# Patient Record
Sex: MALE | Race: WHITE | NOT HISPANIC OR LATINO | URBAN - METROPOLITAN AREA
[De-identification: names, ages, dates, MRNs, and addresses within clinical notes are randomized per-mention and may not be internally consistent; named-entity substitution may affect disease eponyms.]

---

## 2021-10-02 ENCOUNTER — IMMUNIZATION (OUTPATIENT)
Dept: URGENT CARE | Facility: CLINIC | Age: 36
End: 2021-10-02

## 2021-10-02 DIAGNOSIS — Z23 NEED FOR VACCINATION: Primary | ICD-10-CM

## 2021-10-02 PROCEDURE — 0011A COVID-19, MRNA, LNP-S, PF, 100 MCG/0.5 ML DOSE VACCINE: ICD-10-PCS | Mod: S$GLB,,, | Performed by: NURSE PRACTITIONER

## 2021-10-02 PROCEDURE — 0011A COVID-19, MRNA, LNP-S, PF, 100 MCG/0.5 ML DOSE VACCINE: CPT | Mod: S$GLB,,, | Performed by: NURSE PRACTITIONER

## 2021-10-02 PROCEDURE — 91301 COVID-19, MRNA, LNP-S, PF, 100 MCG/0.5 ML DOSE VACCINE: ICD-10-PCS | Mod: S$GLB,,, | Performed by: NURSE PRACTITIONER

## 2021-10-02 PROCEDURE — 91301 COVID-19, MRNA, LNP-S, PF, 100 MCG/0.5 ML DOSE VACCINE: CPT | Mod: S$GLB,,, | Performed by: NURSE PRACTITIONER

## 2022-07-20 ENCOUNTER — HOSPITAL ENCOUNTER (EMERGENCY)
Facility: HOSPITAL | Age: 37
Discharge: HOME | End: 2022-07-20
Attending: EMERGENCY MEDICINE
Payer: COMMERCIAL

## 2022-07-20 VITALS
RESPIRATION RATE: 20 BRPM | DIASTOLIC BLOOD PRESSURE: 95 MMHG | BODY MASS INDEX: 41.16 KG/M2 | HEIGHT: 71 IN | OXYGEN SATURATION: 97 % | SYSTOLIC BLOOD PRESSURE: 166 MMHG | HEART RATE: 86 BPM | WEIGHT: 294 LBS | TEMPERATURE: 99 F

## 2022-07-20 DIAGNOSIS — S93.402A SPRAIN OF LEFT ANKLE, UNSPECIFIED LIGAMENT, INITIAL ENCOUNTER: Primary | ICD-10-CM

## 2022-07-20 DIAGNOSIS — S99.912A LEFT ANKLE INJURY, INITIAL ENCOUNTER: ICD-10-CM

## 2022-07-20 PROCEDURE — 99283 EMERGENCY DEPT VISIT LOW MDM: CPT

## 2022-07-20 PROCEDURE — 25000003 PHARM REV CODE 250: Performed by: PHYSICIAN ASSISTANT

## 2022-07-20 RX ORDER — IBUPROFEN 800 MG/1
800 TABLET ORAL EVERY 8 HOURS PRN
Qty: 30 TABLET | Refills: 0 | Status: SHIPPED | OUTPATIENT
Start: 2022-07-20

## 2022-07-20 RX ORDER — ACETAMINOPHEN 325 MG/1
650 TABLET ORAL
Status: COMPLETED | OUTPATIENT
Start: 2022-07-20 | End: 2022-07-20

## 2022-07-20 RX ADMIN — ACETAMINOPHEN 650 MG: 325 TABLET ORAL at 01:07

## 2022-07-20 NOTE — FIRST PROVIDER EVALUATION
Emergency Department TeleTriage Encounter Note      CHIEF COMPLAINT    Chief Complaint   Patient presents with    Ankle Pain     Stepped off his work truck and thinks he rolled his left ankle. Swelling noted       VITAL SIGNS   Initial Vitals [07/20/22 1256]   BP Pulse Resp Temp SpO2   (!) 190/99 89 18 97.9 °F (36.6 °C) 98 %      MAP       --            ALLERGIES    Review of patient's allergies indicates:   Allergen Reactions    Pcn [penicillins]     Sulfa (sulfonamide antibiotics)        PROVIDER TRIAGE NOTE    Patient presents for evaluation due to left ankle pain since an injury at 11:45 this morning.  Patient stepped off his work truck and rolled his left ankle.  He has pain and swelling to the ankle.  Patient took 800 mg motrin at 9 am this morning.    Will order tylenol and xray pending ED provider evaluation.      Initial orders will be placed and care will be transferred to an alternate provider when patient is roomed for a full evaluation. Any additional orders and the final disposition will be determined by that provider.          ORDERS  Labs Reviewed - No data to display    ED Orders (720h ago, onward)    None            Virtual Visit Note: The provider triage portion of this emergency department evaluation and documentation was performed via [facility], a HIPAA-compliant telemedicine application, in concert with a tele-presenter in the room. A face to face patient evaluation with one of my colleagues will occur once the patient is placed in an emergency department room.      DISCLAIMER: This note was prepared with [employer] voice recognition transcription software. Garbled syntax, mangled pronouns, and other bizarre constructions may be attributed to that software system.

## 2022-07-20 NOTE — ED NOTES
Present to the ED with left ankle pain. Reports stepping off a truck when he felt his left ankle roll. Swelling noted to left ankle, Unable to bear weight to left foot. No bruising or redness noted. No distress noted

## 2022-07-20 NOTE — ED PROVIDER NOTES
"NAME:  Kam Giron  CSN:     [id number]  MRN:    [id number]  ADMIT DATE: 7/20/2022        eMERGENCY dEPARTMENT eNCOUnter    CHIEF COMPLAINT    Chief Complaint   Patient presents with    Ankle Pain     Stepped off his work truck and thinks he rolled his left ankle. Swelling noted       HPI      Kam Giron is a 37 y.o. male who presents to the ED for evaluation of left ankle pain.  Patient reports he rolled his ankle stepping out of his work truck.  He complains of swelling and difficulty bearing weight on the affected extremity.  No other injuries.          ALLERGIES    Review of patient's allergies indicates:   Allergen Reactions    Pcn [penicillins]     Sulfa (sulfonamide antibiotics)        PAST MEDICAL HISTORY  No past medical history on file.    SURGICAL HISTORY    No past surgical history on file.    SOCIAL HISTORY    Social History     Socioeconomic History    Marital status: Single       FAMILY HISTORY    No family history on file.    REVIEW OF SYSTEMS   ROS  All Systems otherwise negative except as noted in the History of Present Illness.        PHYSICAL EXAM    Reviewed Triage Note  VITAL SIGNS:   ED Triage Vitals [07/20/22 1256]   Enc Vitals Group      BP (!) 190/99      Pulse 89      Resp 18      Temp 97.9 °F (36.6 °C)      Temp src Oral      SpO2 98 %      Weight 294 lb      Height 5' 11"      Head Circumference       Peak Flow       Pain Score       Pain Loc       Pain Edu?       Excl. in GC?        Patient Vitals for the past 24 hrs:   BP Temp Temp src Pulse Resp SpO2 Height Weight   07/20/22 1256 (!) 190/99 97.9 °F (36.6 °C) Oral 89 18 98 % 5' 11" (1.803 m) 133.4 kg (294 lb)           Physical Exam    Constitutional:  Well-developed, well-nourished. No acute distress  HENT:  Normocephalic, atraumatic.  Eyes:  EOMI. Conjunctiva normal without discharge.   Neck: Normal range of motion.No stridor. No meningismus.   Respiratory:  No respiratory distress, retractions, or conversational " dyspnea.   Cardiovascular:  Normal heart rate. No pitting lower extremity edema.   Musculoskeletal:  Swelling to the left lateral malleolus, no ankle instability   Integument:  Warm and dry. No rash.  Neurologic:  Normal motor function. No focal deficits noted. Alert and Interactive.  Psychiatric:  Affect normal. Mood normal.         LABS  Pertinent labs reviewed. (See chart for details)   Labs Reviewed - No data to display      RADIOLOGY    Imaging Results          X-Ray Ankle Complete Left (Final result)  Result time 07/20/22 13:56:14    Final result by Jean-Claude Morrell III, MD (07/20/22 13:56:14)                 Impression:      No acute process seen.      Electronically signed by: Jean-Claude Morrell MD  Date:    07/20/2022  Time:    13:56             Narrative:    EXAMINATION:  XR ANKLE COMPLETE 3 VIEW LEFT    CLINICAL HISTORY:  Unspecified injury of left ankle, initial encounter    FINDINGS:  Ankle complete left:    Three views left ankle: There is remote trauma with deformity of the distal fibula.  There is soft tissue swelling around the ankle.  No acute fracture, dislocation, or bone destruction seen.                                PROCEDURES    Procedures      EKG     Interpreted by ERP:         ED COURSE & MEDICAL DECISION MAKING    Pertinent & Imaging studies reviewed. (See chart for details and specific orders.)        Medications   acetaminophen tablet 650 mg (650 mg Oral Given 7/20/22 1339)          No fracture noted on x-ray.  Patient will be placed in an Ace wrap and provided with crutches       DISPOSITION  Patient discharged in stable condition at No discharge date for patient encounter.      DISCHARGE INSTRUCTIONS & MEDS    @DISCHARGEMEDSLIST(<NOROUTINE> error)@      New Prescriptions    IBUPROFEN (ADVIL,MOTRIN) 800 MG TABLET    Take 1 tablet (800 mg total) by mouth every 8 (eight) hours as needed for Pain.           FINAL IMPRESSION    1. Sprain of left ankle, unspecified ligament, initial encounter     2. Left ankle injury, initial encounter              Blood Pressure Follow-Up Advised  Patient advised to follow up with PCP within 3-5 days for blood pressure re-check if blood pressure is equal to or greater than 120/80.         Critical care time spent with this patient (not including separately billable items) was  0 minutes.     DISCLAIMER: This note was prepared with [employer] voice recognition transcription software. Garbled syntax, mangled pronouns, and other bizarre constructions may be attributed to that software system.      Elgin David MD  07/20/2022  2:39 PM          Elgin David MD  07/20/22 6984

## 2022-07-20 NOTE — ED NOTES
Ace wrap applied to left ankle. Tolerated wellPt educated on crutch walking. Verbalize understanding. Return demonstration successful

## 2022-09-05 ENCOUNTER — OFFICE VISIT (OUTPATIENT)
Dept: URGENT CARE | Facility: CLINIC | Age: 37
End: 2022-09-05
Payer: COMMERCIAL

## 2022-09-05 VITALS
HEART RATE: 85 BPM | OXYGEN SATURATION: 95 % | SYSTOLIC BLOOD PRESSURE: 153 MMHG | HEIGHT: 71 IN | BODY MASS INDEX: 41.16 KG/M2 | RESPIRATION RATE: 18 BRPM | TEMPERATURE: 98 F | WEIGHT: 294 LBS | DIASTOLIC BLOOD PRESSURE: 96 MMHG

## 2022-09-05 DIAGNOSIS — J45.901 EXACERBATION OF ASTHMA, UNSPECIFIED ASTHMA SEVERITY, UNSPECIFIED WHETHER PERSISTENT: Primary | ICD-10-CM

## 2022-09-05 DIAGNOSIS — J40 BRONCHITIS: ICD-10-CM

## 2022-09-05 PROCEDURE — 94640 AIRWAY INHALATION TREATMENT: CPT | Mod: 76,S$GLB,, | Performed by: FAMILY MEDICINE

## 2022-09-05 PROCEDURE — 3077F SYST BP >= 140 MM HG: CPT | Mod: CPTII,S$GLB,,

## 2022-09-05 PROCEDURE — 4010F ACE/ARB THERAPY RXD/TAKEN: CPT | Mod: CPTII,S$GLB,,

## 2022-09-05 PROCEDURE — 3080F PR MOST RECENT DIASTOLIC BLOOD PRESSURE >= 90 MM HG: ICD-10-PCS | Mod: CPTII,S$GLB,,

## 2022-09-05 PROCEDURE — 1160F PR REVIEW ALL MEDS BY PRESCRIBER/CLIN PHARMACIST DOCUMENTED: ICD-10-PCS | Mod: CPTII,S$GLB,,

## 2022-09-05 PROCEDURE — 1159F PR MEDICATION LIST DOCUMENTED IN MEDICAL RECORD: ICD-10-PCS | Mod: CPTII,S$GLB,,

## 2022-09-05 PROCEDURE — 1160F RVW MEDS BY RX/DR IN RCRD: CPT | Mod: CPTII,S$GLB,,

## 2022-09-05 PROCEDURE — 3077F PR MOST RECENT SYSTOLIC BLOOD PRESSURE >= 140 MM HG: ICD-10-PCS | Mod: CPTII,S$GLB,,

## 2022-09-05 PROCEDURE — 3008F BODY MASS INDEX DOCD: CPT | Mod: CPTII,S$GLB,,

## 2022-09-05 PROCEDURE — 94640 PR INHAL RX, AIRWAY OBST/DX SPUTUM INDUCT: ICD-10-PCS | Mod: 76,S$GLB,, | Performed by: FAMILY MEDICINE

## 2022-09-05 PROCEDURE — 99203 OFFICE O/P NEW LOW 30 MIN: CPT | Mod: 25,S$GLB,,

## 2022-09-05 PROCEDURE — 1159F MED LIST DOCD IN RCRD: CPT | Mod: CPTII,S$GLB,,

## 2022-09-05 PROCEDURE — 3080F DIAST BP >= 90 MM HG: CPT | Mod: CPTII,S$GLB,,

## 2022-09-05 PROCEDURE — 3008F PR BODY MASS INDEX (BMI) DOCUMENTED: ICD-10-PCS | Mod: CPTII,S$GLB,,

## 2022-09-05 PROCEDURE — 4010F PR ACE/ARB THEARPY RXD/TAKEN: ICD-10-PCS | Mod: CPTII,S$GLB,,

## 2022-09-05 PROCEDURE — 99203 PR OFFICE/OUTPT VISIT, NEW, LEVL III, 30-44 MIN: ICD-10-PCS | Mod: 25,S$GLB,,

## 2022-09-05 RX ORDER — IPRATROPIUM BROMIDE 0.5 MG/2.5ML
0.5 SOLUTION RESPIRATORY (INHALATION)
Status: COMPLETED | OUTPATIENT
Start: 2022-09-05 | End: 2022-09-05

## 2022-09-05 RX ORDER — ALBUTEROL SULFATE 0.83 MG/ML
2.5 SOLUTION RESPIRATORY (INHALATION)
Status: COMPLETED | OUTPATIENT
Start: 2022-09-05 | End: 2022-09-05

## 2022-09-05 RX ORDER — PROMETHAZINE HYDROCHLORIDE AND DEXTROMETHORPHAN HYDROBROMIDE 6.25; 15 MG/5ML; MG/5ML
5 SYRUP ORAL NIGHTLY PRN
Qty: 180 ML | Refills: 0 | Status: SHIPPED | OUTPATIENT
Start: 2022-09-05 | End: 2022-09-15

## 2022-09-05 RX ORDER — BENZONATATE 200 MG/1
200 CAPSULE ORAL 3 TIMES DAILY PRN
Qty: 30 CAPSULE | Refills: 0 | Status: SHIPPED | OUTPATIENT
Start: 2022-09-05 | End: 2022-09-15

## 2022-09-05 RX ORDER — PREDNISONE 20 MG/1
40 TABLET ORAL DAILY
Qty: 10 TABLET | Refills: 0 | Status: SHIPPED | OUTPATIENT
Start: 2022-09-05 | End: 2022-09-10

## 2022-09-05 RX ADMIN — ALBUTEROL SULFATE 2.5 MG: 0.83 SOLUTION RESPIRATORY (INHALATION) at 03:09

## 2022-09-05 RX ADMIN — IPRATROPIUM BROMIDE 0.5 MG: 0.5 SOLUTION RESPIRATORY (INHALATION) at 03:09

## 2022-09-05 NOTE — PROGRESS NOTES
"Subjective:       Patient ID: Kam Giron is a 37 y.o. male.    Vitals:  height is 5' 11" (1.803 m) and weight is 133.4 kg (294 lb). His tympanic temperature is 97.6 °F (36.4 °C). His blood pressure is 153/96 (abnormal) and his pulse is 85. His respiration is 18 and oxygen saturation is 95%.     Chief Complaint: URI    Pt is a 36 y/o M with a hx of HTN and asthma who presents with coughing and wheezing x3 weeks. Initially had congestion, runny nose at onset of sxs but those have improved. Has been using albuterol inhaler once in morning and once at night. Coughing worse at night. Never hospitalized for asthma exacerbation. Denies any SoB, CP, fevers, N/V/D, abd pain.    URI   This is a new problem. The current episode started 1 to 4 weeks ago. The problem has been unchanged. There has been no fever. Associated symptoms include congestion and coughing. Pertinent negatives include no abdominal pain, chest pain, diarrhea, ear pain, headaches, nausea, neck pain, rash, sneezing, sore throat, vomiting or wheezing. He has tried decongestant for the symptoms. The treatment provided mild relief.     Constitution: Negative for chills and fever.   HENT:  Positive for congestion and postnasal drip. Negative for ear pain, ear discharge and sore throat.    Neck: Negative for neck pain and neck stiffness.   Cardiovascular:  Negative for chest pain.   Eyes:  Negative for eye discharge and eye itching.   Respiratory:  Positive for cough, sputum production and asthma. Negative for COPD, shortness of breath and wheezing.    Gastrointestinal:  Negative for abdominal pain, nausea, vomiting and diarrhea.   Musculoskeletal:  Negative for muscle ache.   Skin:  Negative for rash.   Allergic/Immunologic: Positive for asthma. Negative for sneezing.   Neurological:  Negative for dizziness and headaches.     Objective:      Physical Exam   Constitutional: He is oriented to person, place, and time. He appears well-developed. He is " cooperative.  Non-toxic appearance. He does not appear ill. No distress.   HENT:   Head: Normocephalic and atraumatic.   Ears:   Right Ear: Hearing, tympanic membrane, external ear and ear canal normal.   Left Ear: Hearing, tympanic membrane, external ear and ear canal normal.   Nose: Nose normal. No mucosal edema, rhinorrhea or nasal deformity. No epistaxis. Right sinus exhibits no maxillary sinus tenderness and no frontal sinus tenderness. Left sinus exhibits no maxillary sinus tenderness and no frontal sinus tenderness.   Mouth/Throat: Uvula is midline, oropharynx is clear and moist and mucous membranes are normal. No trismus in the jaw. Normal dentition. No uvula swelling. No oropharyngeal exudate, posterior oropharyngeal edema or posterior oropharyngeal erythema.   Eyes: Conjunctivae and lids are normal. No scleral icterus.   Neck: Trachea normal and phonation normal. Neck supple. No edema present. No erythema present. No neck rigidity present.   Cardiovascular: Normal rate, regular rhythm, normal heart sounds and normal pulses.   Pulmonary/Chest: Effort normal. No respiratory distress. He has no decreased breath sounds. He has wheezes (scattered expiratory wheezing). He has no rhonchi. He has no rales.         Comments: Non-productive coughing. SpO2 >95%    Abdominal: Normal appearance.   Musculoskeletal: Normal range of motion.         General: No deformity. Normal range of motion.   Neurological: He is alert and oriented to person, place, and time. He exhibits normal muscle tone. Coordination normal.   Skin: Skin is warm, dry, intact, not diaphoretic and not pale.   Psychiatric: His speech is normal and behavior is normal. Judgment and thought content normal.   Nursing note and vitals reviewed.      Assessment:       1. Exacerbation of asthma, unspecified asthma severity, unspecified whether persistent    2. Bronchitis          Plan:         Exacerbation of asthma, unspecified asthma severity, unspecified  "whether persistent  -     ipratropium 0.02 % nebulizer solution 0.5 mg  -     albuterol nebulizer solution 2.5 mg  -     predniSONE (DELTASONE) 20 MG tablet; Take 2 tablets (40 mg total) by mouth once daily. for 5 days  Dispense: 10 tablet; Refill: 0    Bronchitis  -     promethazine-dextromethorphan (PROMETHAZINE-DM) 6.25-15 mg/5 mL Syrp; Take 5 mLs by mouth nightly as needed (cough).  Dispense: 180 mL; Refill: 0  -     benzonatate (TESSALON) 200 MG capsule; Take 1 capsule (200 mg total) by mouth 3 (three) times daily as needed for Cough.  Dispense: 30 capsule; Refill: 0       Wheezing improved following duonebs. SpO2 remains >96%. Pt reports subjective improvement.     Additional MDM:     Heart Failure Score:   COPD = No    Patient Instructions   Asthma Discharge   If your condition worsens or fails to improve we recommend that you receive another evaluation at the ER immediately or contact your PCP to discuss your concerns or return here. You must understand that you've received an urgent care treatment only and that you may be released before all your medical problems are known or treated. You the patient will arrange for follouwp care as instructed.   Keep the scheduled appointment with your specialist as discussed.    Rest and fluids are important  Take steroids as prescribed. Monitor blood pressure while on steroids.  Take inhaler as prescribed and needed for wheezing  -  Flonase (fluticasone) is a nasal spray which is available over the counter and may help with your symptoms.   -  If you have hypertension avoid using the "D" which is the decongestant.  Instead you can use Coricidin HBP for cold and cough symptoms.    -  If you just have drainage you can take plain Zyrtec, Claritin or Allegra   -  If you just have a congested feeling you can take pseudoephedrine (unless you have high blood pressure) which you have to sign for behind the counter. Do not buy the phenylephrine which is on the shelf as it is not " effective   -  Rest and fluids are also important.   -  Tylenol or ibuprofen can also be used as directed for pain unless you have an allergy to them or medical condition such as stomach ulcers, kidney or liver disease or blood thinners etc for which you should not be taking these type of medications.   Please follow up with your primary care doctor or specialist in the next 48-72hrs as needed and if no improvement  If you  smoke, please stop smoking.

## 2022-09-05 NOTE — PATIENT INSTRUCTIONS
"Asthma Discharge   If your condition worsens or fails to improve we recommend that you receive another evaluation at the ER immediately or contact your PCP to discuss your concerns or return here. You must understand that you've received an urgent care treatment only and that you may be released before all your medical problems are known or treated. You the patient will arrange for follouwp care as instructed.   Keep the scheduled appointment with your specialist as discussed.    Rest and fluids are important  Take steroids as prescribed. Monitor blood pressure while on steroids.  Take inhaler as prescribed and needed for wheezing  -  Flonase (fluticasone) is a nasal spray which is available over the counter and may help with your symptoms.   -  If you have hypertension avoid using the "D" which is the decongestant.  Instead you can use Coricidin HBP for cold and cough symptoms.    -  If you just have drainage you can take plain Zyrtec, Claritin or Allegra   -  If you just have a congested feeling you can take pseudoephedrine (unless you have high blood pressure) which you have to sign for behind the counter. Do not buy the phenylephrine which is on the shelf as it is not effective   -  Rest and fluids are also important.   -  Tylenol or ibuprofen can also be used as directed for pain unless you have an allergy to them or medical condition such as stomach ulcers, kidney or liver disease or blood thinners etc for which you should not be taking these type of medications.   Please follow up with your primary care doctor or specialist in the next 48-72hrs as needed and if no improvement  If you  smoke, please stop smoking.    "

## 2023-04-16 ENCOUNTER — OFFICE VISIT (OUTPATIENT)
Dept: URGENT CARE | Facility: CLINIC | Age: 38
End: 2023-04-16
Payer: COMMERCIAL

## 2023-04-16 VITALS
RESPIRATION RATE: 19 BRPM | WEIGHT: 294 LBS | HEART RATE: 81 BPM | HEIGHT: 71 IN | BODY MASS INDEX: 41.16 KG/M2 | SYSTOLIC BLOOD PRESSURE: 133 MMHG | DIASTOLIC BLOOD PRESSURE: 89 MMHG | OXYGEN SATURATION: 95 % | TEMPERATURE: 98 F

## 2023-04-16 DIAGNOSIS — R09.81 NASAL CONGESTION: ICD-10-CM

## 2023-04-16 DIAGNOSIS — H66.92 LEFT OTITIS MEDIA, UNSPECIFIED OTITIS MEDIA TYPE: Primary | ICD-10-CM

## 2023-04-16 PROCEDURE — 99213 PR OFFICE/OUTPT VISIT, EST, LEVL III, 20-29 MIN: ICD-10-PCS | Mod: S$GLB,,, | Performed by: NURSE PRACTITIONER

## 2023-04-16 PROCEDURE — 99213 OFFICE O/P EST LOW 20 MIN: CPT | Mod: S$GLB,,, | Performed by: NURSE PRACTITIONER

## 2023-04-16 RX ORDER — DOXYCYCLINE 100 MG/1
100 CAPSULE ORAL EVERY 12 HOURS
Qty: 14 CAPSULE | Refills: 0 | Status: SHIPPED | OUTPATIENT
Start: 2023-04-16 | End: 2023-04-23

## 2023-04-16 RX ORDER — CETIRIZINE HYDROCHLORIDE 10 MG/1
10 TABLET ORAL DAILY
Qty: 30 TABLET | Refills: 0 | Status: SHIPPED | OUTPATIENT
Start: 2023-04-16

## 2023-04-16 RX ORDER — PREDNISONE 20 MG/1
20 TABLET ORAL DAILY
Qty: 5 TABLET | Refills: 0 | Status: SHIPPED | OUTPATIENT
Start: 2023-04-16 | End: 2023-04-21

## 2023-04-16 RX ORDER — FLUTICASONE PROPIONATE 50 MCG
1 SPRAY, SUSPENSION (ML) NASAL 2 TIMES DAILY
Qty: 9.9 ML | Refills: 0 | Status: SHIPPED | OUTPATIENT
Start: 2023-04-16

## 2023-04-16 NOTE — PATIENT INSTRUCTIONS
- Follow up with your PCP or specialty clinic as directed in the next 1-2 weeks if not improved or as needed.  You can call [phone] to schedule an appointment with the appropriate provider.    - Go to the ER or seek medical attention immediately if you develop new or worsening symptoms.    - You must understand that you have received an Urgent Care treatment only and that you may be released before all of your medical problems are known or treated.   - You, the patient, will arrange for follow up care as instructed.   - If your condition worsens or fails to improve we recommend that you receive another evaluation at the ER immediately or contact your PCP to discuss your concerns or return here.

## 2023-04-16 NOTE — PROGRESS NOTES
"Subjective:      Patient ID: Kam Giron is a 38 y.o. male.    Vitals:  height is 5' 11" (1.803 m) and weight is 133.4 kg (294 lb). His oral temperature is 98.2 °F (36.8 °C). His blood pressure is 133/89 and his pulse is 81. His respiration is 19 and oxygen saturation is 95%.     Chief Complaint: Otalgia    38-year-old male presents to clinic for evaluation of left ear pain, muffled hearing, and discomfort.  Patient reports recently being treated for a sinus infection 1 week ago.  States he was given a steroid injection and a Z-Viraj.  He still reports some mild congestion, cough, however states the ear pain is new.  He denies fever.  He is awake and alert, answers questions appropriately, no acute distress noted on today's visit.    Otalgia   There is pain in the left ear. This is a new problem. The problem has been gradually worsening. There has been no fever. Associated symptoms include abdominal pain and coughing. Pertinent negatives include no ear discharge or vomiting.     Constitution: Negative for activity change, appetite change, chills, sweating, fatigue and fever.   HENT:  Positive for ear pain and congestion. Negative for ear discharge.    Cardiovascular:  Negative for chest pain.   Respiratory:  Positive for cough. Negative for shortness of breath.    Gastrointestinal:  Positive for abdominal pain. Negative for nausea and vomiting.   Neurological:  Negative for dizziness.    Objective:     Physical Exam   Constitutional: He is oriented to person, place, and time. He appears well-developed.  Non-toxic appearance. He does not appear ill. No distress.   HENT:   Head: Normocephalic and atraumatic. Head is without abrasion, without contusion and without laceration.   Ears:   Right Ear: External ear normal.   Left Ear: External ear normal. Tympanic membrane is erythematous. A middle ear effusion is present.   Nose: Congestion present.   Mouth/Throat: Oropharynx is clear and moist and mucous " membranes are normal.   Eyes: Conjunctivae, EOM and lids are normal. Right eye exhibits no discharge. Left eye exhibits no discharge.   Neck: Trachea normal and phonation normal.   Cardiovascular: Normal rate and normal heart sounds.   Pulmonary/Chest: Effort normal and breath sounds normal. No stridor. No respiratory distress. He has no wheezes.   Abdominal: Normal appearance.   Musculoskeletal: Normal range of motion.         General: Normal range of motion.   Neurological: He is alert and oriented to person, place, and time.   Skin: Skin is warm, dry, intact, not diaphoretic and not pale. No abrasion, No burn and No ecchymosis   Psychiatric: His speech is normal and behavior is normal. Mood, judgment and thought content normal.   Nursing note and vitals reviewed.    Assessment:     1. Left otitis media, unspecified otitis media type    2. Nasal congestion        Plan:       Left otitis media, unspecified otitis media type  -     predniSONE (DELTASONE) 20 MG tablet; Take 1 tablet (20 mg total) by mouth once daily. for 5 days  Dispense: 5 tablet; Refill: 0  -     doxycycline (VIBRAMYCIN) 100 MG Cap; Take 1 capsule (100 mg total) by mouth every 12 (twelve) hours. for 7 days  Dispense: 14 capsule; Refill: 0    Nasal congestion  -     cetirizine (ZYRTEC) 10 MG tablet; Take 1 tablet (10 mg total) by mouth once daily.  Dispense: 30 tablet; Refill: 0  -     fluticasone propionate (FLONASE) 50 mcg/actuation nasal spray; 1 spray (50 mcg total) by Each Nostril route 2 (two) times daily.  Dispense: 9.9 mL; Refill: 0      Patient Instructions   - Follow up with your PCP or specialty clinic as directed in the next 1-2 weeks if not improved or as needed.  You can call [phone] to schedule an appointment with the appropriate provider.    - Go to the ER or seek medical attention immediately if you develop new or worsening symptoms.    - You must understand that you have received an Urgent Care treatment only and that you  may be released before all of your medical problems are known or treated.   - You, the patient, will arrange for follow up care as instructed.   - If your condition worsens or fails to improve we recommend that you receive another evaluation at the ER immediately or contact your PCP to discuss your concerns or return here.

## 2024-10-03 ENCOUNTER — OFFICE VISIT (OUTPATIENT)
Dept: PODIATRY | Facility: CLINIC | Age: 39
End: 2024-10-03
Payer: COMMERCIAL

## 2024-10-03 VITALS — BODY MASS INDEX: 41.18 KG/M2 | HEIGHT: 71 IN | WEIGHT: 294.13 LBS

## 2024-10-03 DIAGNOSIS — M79.672 PAIN IN BOTH FEET: Primary | ICD-10-CM

## 2024-10-03 DIAGNOSIS — R60.0 BILATERAL EDEMA OF LOWER EXTREMITY: ICD-10-CM

## 2024-10-03 DIAGNOSIS — M79.671 PAIN IN BOTH FEET: Primary | ICD-10-CM

## 2024-10-03 PROCEDURE — 99203 OFFICE O/P NEW LOW 30 MIN: CPT | Mod: S$GLB,,, | Performed by: PODIATRIST

## 2024-10-03 PROCEDURE — 99999 PR PBB SHADOW E&M-EST. PATIENT-LVL III: CPT | Mod: PBBFAC,,, | Performed by: PODIATRIST

## 2024-10-03 NOTE — PROGRESS NOTES
Subjective:     Patient ID: Kam Giron is a 39 y.o. male.    Chief Complaint: Foot Swelling (B/l ankles)    Kam is a 39 y.o. male who presents to the podiatry clinic  with complaint of  bilateral LE swelling Onset of the symptoms was several weeks ago. Precipitating event: none known. Current symptoms include: ability to bear weight, but with some pain. Aggravating factors: any weight bearing. Symptoms have progressed to a point and plateaued. Patient has had no prior foot problems. Evaluation to date: none. Treatment to date: none.     Review of Systems   Constitutional: Negative for chills.   Cardiovascular:  Negative for chest pain and claudication.   Respiratory:  Negative for cough.    Skin:  Positive for color change, dry skin and nail changes.   Musculoskeletal:  Positive for joint pain.   Gastrointestinal:  Negative for nausea.   Neurological:  Positive for paresthesias. Negative for numbness.   Psychiatric/Behavioral:  The patient is not nervous/anxious.         Objective:     Physical Exam  Constitutional:       Appearance: He is well-developed.      Comments: Oriented to time, place, and person.   Cardiovascular:      Comments: DP and PT pulses are palpable bilaterally. 3 sec capillary refill time and toes and feet are warm to touch proximally .  There is  hair growth on the feet and toes b/l. There is no edema b/l. No spider veins or varicosities present b/l.     B/L LE swelling   Musculoskeletal:      Comments: Equinus noted b/l ankles with < 10 deg DF noted. MMT 5/5 in DF/PF/Inv/Ev resistance with no reproduction of pain in any direction. Passive range of motion of ankle and pedal joints is painless b/l.     Feet:      Right foot:      Skin integrity: No callus or dry skin.      Left foot:      Skin integrity: No callus or dry skin.   Lymphadenopathy:      Comments: Negative lymphadenopathy bilateral popliteal fossa and tarsal tunnel.   Skin:     Comments: No open lesions, lacerations  or wounds noted.Interdigital spaces clean, dry and intact b/l. No erythema noted to b/l foot.  Nails normal color and trophic qualities.     Neurological:      Mental Status: He is alert.      Comments: Light touch, proprioception, and sharp/dull sensation are all intact bilaterally. Protective threshold with the [place]-Wienstein monofilament is intact bilaterally.    Psychiatric:         Behavior: Behavior is cooperative.           Assessment:      Encounter Diagnoses   Name Primary?    Pain in both feet Yes    Bilateral edema of lower extremity      Plan:     Kam was seen today for foot swelling.    Diagnoses and all orders for this visit:    Pain in both feet  -     Ankle Brachial Indices (GINNY); Future  -     Ambulatory referral/consult to Vascular Surgery; Future    Bilateral edema of lower extremity      I counseled the patient on his conditions, their implications and medical management.        GINNY ordered    Referral placed to vascular surgery    Discussed conservative treatment with shoes of adequate dimensions, material, and style to alleviate symptoms and delay or prevent surgical intervention.      RTC PRN

## 2024-10-17 ENCOUNTER — HOSPITAL ENCOUNTER (OUTPATIENT)
Dept: CARDIOLOGY | Facility: HOSPITAL | Age: 39
Discharge: HOME | End: 2024-10-17
Attending: PODIATRIST
Payer: COMMERCIAL

## 2024-10-17 DIAGNOSIS — M79.671 PAIN IN BOTH FEET: ICD-10-CM

## 2024-10-17 DIAGNOSIS — M79.672 PAIN IN BOTH FEET: ICD-10-CM

## 2024-10-17 LAB
LEFT ABI: 1.47
LEFT ARM BP: 127 MMHG
LEFT DORSALIS PEDIS: 194 MMHG
LEFT POSTERIOR TIBIAL: 200 MMHG
LEFT TBI: 0.9
LEFT TOE PRESSURE: 122 MMHG
RIGHT ABI: 1.43
RIGHT ARM BP: 136 MMHG
RIGHT DORSALIS PEDIS: 195 MMHG
RIGHT POSTERIOR TIBIAL: 193 MMHG
RIGHT TBI: 0.95
RIGHT TOE PRESSURE: 129 MMHG

## 2024-10-17 PROCEDURE — 93922 UPR/L XTREMITY ART 2 LEVELS: CPT | Mod: 26,,, | Performed by: SURGERY

## 2024-10-17 PROCEDURE — 93922 UPR/L XTREMITY ART 2 LEVELS: CPT

## 2024-10-21 ENCOUNTER — INITIAL CONSULT (OUTPATIENT)
Dept: VASCULAR SURGERY | Facility: CLINIC | Age: 39
End: 2024-10-21
Payer: COMMERCIAL

## 2024-10-21 VITALS
DIASTOLIC BLOOD PRESSURE: 88 MMHG | WEIGHT: 315 LBS | HEIGHT: 71 IN | BODY MASS INDEX: 44.1 KG/M2 | HEART RATE: 81 BPM | SYSTOLIC BLOOD PRESSURE: 145 MMHG

## 2024-10-21 DIAGNOSIS — M79.672 PAIN IN BOTH FEET: ICD-10-CM

## 2024-10-21 DIAGNOSIS — I89.0 LYMPHEDEMA: Primary | ICD-10-CM

## 2024-10-21 DIAGNOSIS — M79.671 PAIN IN BOTH FEET: ICD-10-CM

## 2024-10-21 PROCEDURE — 99999 PR PBB SHADOW E&M-EST. PATIENT-LVL III: CPT | Mod: PBBFAC,,, | Performed by: SURGERY

## 2024-10-21 PROCEDURE — 99203 OFFICE O/P NEW LOW 30 MIN: CPT | Mod: S$GLB,,, | Performed by: SURGERY

## 2024-10-21 NOTE — PROGRESS NOTES
Glen Sun MD, PhD  [facility]   10/21/2024    HPI:  Kam Giron is a 39 y.o. male with a history of  leg swelling , who was referred by Dr. Burns.      Patient states location is bilateral ankles occurring for months.  Associated signs and symptoms or day or with standing.  No wounds      There is no problem list on file for this patient.    Past Medical History:   Diagnosis Date    ADHD (attention deficit hyperactivity disorder)      No past surgical history on file.  No family history on file.  Social History     Socioeconomic History    Marital status: Single   Tobacco Use    Smoking status: Every Day     Types: Cigarettes    Smokeless tobacco: Never   Social History Narrative    ** Merged History Encounter **          Social Drivers of Health     Financial Resource Strain: Low Risk  (10/2/2024)    Overall Financial Resource Strain (CARDIA)     Difficulty of Paying Living Expenses: Not hard at all   Food Insecurity: No Food Insecurity (10/2/2024)    Hunger Vital Sign     Worried About Running Out of Food in the Last Year: Never true     Ran Out of Food in the Last Year: Never true   Physical Activity: Inactive (10/2/2024)    Exercise Vital Sign     Days of Exercise per Week: 0 days     Minutes of Exercise per Session: 0 min   Stress: No Stress Concern Present (10/2/2024)    [employer] - Occupational Stress Questionnaire     Feeling of Stress : Only a little   Housing Stability: Unknown (10/2/2024)    Housing Stability Vital Sign     Unable to Pay for Housing in the Last Year: No       Current Outpatient Medications:     cetirizine (ZYRTEC) 10 MG tablet, Take 1 tablet (10 mg total) by mouth once daily. (Patient not taking: Reported on 10/21/2024), Disp: 30 tablet, Rfl: 0    fluticasone propionate (FLONASE) 50 mcg/actuation nasal spray, 1 spray (50 mcg total) by Each Nostril route 2 (two) times daily., Disp: 9.9 mL, Rfl: 0    ibuprofen  "(ADVIL,MOTRIN) 800 MG tablet, Take 1 tablet (800 mg total) by mouth every 8 (eight) hours as needed for Pain., Disp: 30 tablet, Rfl: 0    REVIEW OF SYSTEMS:  ROXANN       VITALS:  Vitals:    10/21/24 1306   BP: (!) 145/88   BP Location: Left arm   Patient Position: Sitting   Pulse: 81   Weight: (!) 148.4 kg (327 lb 0.8 oz)   Height: 5' 11" (1.803 m)        PHYSICAL EXAM:   Physical Exam         LAB RESULTS:  No results found for: "CBC"  No results found for: "LABPROT", "INR"  No results found for: "NA", "K", "CL", "CO2", "GLU", "BUN", "CREATININE", "CALCIUM", "ANIONGAP", "EGFRNONAA"  No results found for: "WBC", "RBC", "HGB", "HCT", "MCV", "MCH", "MCHC", "RDW", "PLT", "MPV", "GRAN", "LYMPH", "MONO", "EOS", "BASO", "EOSINOPHIL", "BASOPHIL", "DIFFMETHOD"  .No results found for: "HGBA1C"    IMAGING:  All pertinent imaging has been reviewed and interpreted independently.      IMP/PLAN:  Kam Giron is a 39 y.o. male with bilateral ankle swelling of unknown origin.  No evidence of peripheral arterial insufficiency.  No venous studies are available.  No wounds present      Plan:    Return to clinic in 4-6 weeks with venous insufficiency study  Knee-high compression stockings ordered            I spent 40 minutes evaluating this patient and greater than 50% of the time was spent counseling, coordinator care and discussing the plan of care.  All questions were answered and patient stated understanding with agreement with the above treatment plan.    Glen Sun MD, PhD  Vascular and Endovascular Surgery    "